# Patient Record
Sex: MALE | Race: WHITE
[De-identification: names, ages, dates, MRNs, and addresses within clinical notes are randomized per-mention and may not be internally consistent; named-entity substitution may affect disease eponyms.]

---

## 2018-10-18 ENCOUNTER — HOSPITAL ENCOUNTER (EMERGENCY)
Dept: HOSPITAL 89 - ER | Age: 18
Discharge: HOME | End: 2018-10-18
Payer: COMMERCIAL

## 2018-10-18 VITALS — SYSTOLIC BLOOD PRESSURE: 136 MMHG | DIASTOLIC BLOOD PRESSURE: 84 MMHG

## 2018-10-18 DIAGNOSIS — B35.6: ICD-10-CM

## 2018-10-18 DIAGNOSIS — R10.11: Primary | ICD-10-CM

## 2018-10-18 LAB — PLATELET COUNT, AUTOMATED: 232 K/UL (ref 150–450)

## 2018-10-18 PROCEDURE — 99284 EMERGENCY DEPT VISIT MOD MDM: CPT

## 2018-10-18 PROCEDURE — 84295 ASSAY OF SERUM SODIUM: CPT

## 2018-10-18 PROCEDURE — 84520 ASSAY OF UREA NITROGEN: CPT

## 2018-10-18 PROCEDURE — 82374 ASSAY BLOOD CARBON DIOXIDE: CPT

## 2018-10-18 PROCEDURE — 82435 ASSAY OF BLOOD CHLORIDE: CPT

## 2018-10-18 PROCEDURE — 82565 ASSAY OF CREATININE: CPT

## 2018-10-18 PROCEDURE — 85025 COMPLETE CBC W/AUTO DIFF WBC: CPT

## 2018-10-18 PROCEDURE — 82040 ASSAY OF SERUM ALBUMIN: CPT

## 2018-10-18 PROCEDURE — 84155 ASSAY OF PROTEIN SERUM: CPT

## 2018-10-18 PROCEDURE — 81001 URINALYSIS AUTO W/SCOPE: CPT

## 2018-10-18 PROCEDURE — 82947 ASSAY GLUCOSE BLOOD QUANT: CPT

## 2018-10-18 PROCEDURE — 82310 ASSAY OF CALCIUM: CPT

## 2018-10-18 PROCEDURE — 84460 ALANINE AMINO (ALT) (SGPT): CPT

## 2018-10-18 PROCEDURE — 76705 ECHO EXAM OF ABDOMEN: CPT

## 2018-10-18 PROCEDURE — 84132 ASSAY OF SERUM POTASSIUM: CPT

## 2018-10-18 PROCEDURE — 82247 BILIRUBIN TOTAL: CPT

## 2018-10-18 PROCEDURE — 84450 TRANSFERASE (AST) (SGOT): CPT

## 2018-10-18 PROCEDURE — 82150 ASSAY OF AMYLASE: CPT

## 2018-10-18 PROCEDURE — 83690 ASSAY OF LIPASE: CPT

## 2018-10-18 PROCEDURE — 84075 ASSAY ALKALINE PHOSPHATASE: CPT

## 2018-10-18 PROCEDURE — 86140 C-REACTIVE PROTEIN: CPT

## 2018-10-18 NOTE — ER REPORT
History and Physical


Time Seen By MD:  13:28


Hx. of Stated Complaint:  


Episodic RUQ pain since late August, increasing in frequency, self limiting. No 


N/V/D.


HPI/ROS


CHIEF COMPLAINT: Right upper quadrant abdominal pain





HISTORY OF PRESENT ILLNESS: 18-year-old male patient presents to emergency room 


with complaint of right upper quadrant abdominal pain. Patient states he's had 


the pain intermittently for 3 months. He states that he son is a 15 cc make the 


pain better or worse. Patient states that oftentimes pain will be dull and may 


last for up to an hour. He states that other times she can be very sharp and 


just lasts for a few seconds. Patient states he's not noted anything that seems 


to make the pain better or worse. He denies having any fevers, chills, nausea, 


vomiting or diarrhea. Patient denies any fevers or chills.





REVIEW OF SYSTEMS:


Respiratory: No cough, no dyspnea.


Cardiovascular: No chest pain, no palpitations.


Gastrointestinal: As noted above


Musculoskeletal: No back pain.


Allergies:  


Coded Allergies:  


     Latex, Natural Rubber (Verified  Allergy, Mild, ITCHY THROAT, 10/18/18)


Home Meds


Active Scripts


Terbinafine Hcl (TERBINAFINE HCL) 250 Mg Tablet, 250 MG PO DAILY, #14 TAB


   Prov:BERNABE MOYER         10/18/18


Dicyclomine Hcl (DICYCLOMINE HCL) 20 Mg Tablet, 20 MG PO QID, #60 TAB


   Prov:BERNABE MOYER         10/18/18


Past Medical/Surgical History


Patient denies any pertinent medical or surgical history.


Reviewed Nurses Notes:  Yes


Hx Substance Use Disorder:  No


Constitutional





Vital Sign - Last 24 Hours








 10/18/18 10/18/18 10/18/18 10/18/18





 12:53 13:10 14:02 14:17


 


Temp 97.7 98.1  


 


Pulse 65 78 67 66


 


Resp 14 16  


 


B/P (MAP) 134/99 136/84  


 


Pulse Ox 95 96 95 95


 


O2 Delivery Room Air Room Air  


 


    





 10/18/18 10/18/18 10/18/18 10/18/18





 14:32 14:37 14:52 15:07


 


Pulse 62 68 57 72


 


Pulse Ox 95 96 93 93





 10/18/18   





 15:22   


 


Pulse 59   


 


Pulse Ox 94   








Physical Exam


  General Appearance: The patient is alert, has no immediate need for airway 


protection and no current signs of toxicity.


Respiratory: Chest is non tender, lungs are clear to auscultation.


Cardiac: regular rate and rhythm


Gastrointestinal: Abdomen is soft and tender in the right upper quadrant, no 


masses, bowel sounds normal.


Musculoskeletal:  Neck: Neck is supple and non tender.


   Extremities have full range of motion and are non tender.


Skin: No rashes or lesions.





DIFFERENTIAL DIAGNOSIS: After history and physical exam differential diagnosis 


was considered for abdominal pain including but not limited to appendicitis, 


cholecystitis, gastritis and urinary tract infection.





Medical Decision Making


Data Points


Result Diagram:  


10/18/18 1329                                                                   


            10/18/18 1329





Laboratory





Hematology








Test


 10/18/18


13:10 10/18/18


13:29


 


Urine Color Yellow  


 


Urine Clarity Clear  


 


Urine pH


 5.0 pH


(4.8-9.5) 





 


Urine Specific Gravity 1.014  


 


Urine Protein


 Negative mg/dL


(NEGATIVE) 





 


Urine Glucose (UA)


 Negative mg/dL


(NEGATIVE) 





 


Urine Ketones


 Negative mg/dL


(NEGATIVE) 





 


Urine Blood


 Negative


(NEGATIVE) 





 


Urine Nitrite


 Negative


(NEGATIVE) 





 


Urine Bilirubin


 Negative


(NEGATIVE) 





 


Urine Urobilinogen


 Negative mg/dL


(0.2-1.9) 





 


Urine Leukocyte Esterase


 Negative


(NEGATIVE) 





 


Urine RBC


 None /HPF


(0-2/HPF) 





 


Urine WBC


 <1 /HPF


(0-5/HPF) 





 


Urine Squamous Epithelial


Cells None /LPF


(</=FEW) 





 


Urine Transitional Epithelial


Cells Few /LPF


(NONE-FEW) 





 


Urine Bacteria


 Negative /HPF


(NONE-FEW) 





 


Urine Mucus


 None /HPF


(NONE-FEW) 





 


Red Blood Count


 


 5.60 M/uL


(4.00-5.60)


 


Mean Corpuscular Volume


 


 93.9 fL


(80.0-96.0)


 


Mean Corpuscular Hemoglobin


 


 32.2 pg


(26.0-33.0)


 


Mean Corpuscular Hemoglobin


Concent 


 34.3 g/dL


(32.0-36.0)


 


Red Cell Distribution Width


 


 12.9 %


(11.5-14.5)


 


Mean Platelet Volume


 


 8.6 fL


(7.2-11.1)


 


Neutrophils (%) (Auto)


 


 71.5 %


(39.4-72.5)


 


Lymphocytes (%) (Auto)


 


 19.2 %


(17.6-49.6)


 


Monocytes (%) (Auto)


 


 7.5 %


(4.1-12.4)


 


Eosinophils (%) (Auto)


 


 1.5 %


(0.4-6.7)


 


Basophils (%) (Auto)


 


 0.3 %


(0.3-1.4)


 


Nucleated RBC Relative Count


(auto) 


 0.0 /100WBC 





 


Neutrophils # (Auto)


 


 6.2 K/uL


(2.0-7.4)


 


Lymphocytes # (Auto)


 


 1.6 K/uL


(1.3-3.6)


 


Monocytes # (Auto)


 


 0.6 K/uL


(0.3-1.0)


 


Eosinophils # (Auto)


 


 0.1 K/uL


(0.0-0.5)


 


Basophils # (Auto)


 


 0.0 K/uL


(0.0-0.1)


 


Nucleated RBC Absolute Count


(auto) 


 0.00 K/uL 





 


Sodium Level


 


 141 mmol/L


(137-145)


 


Potassium Level


 


 3.6 mmol/L


(3.5-5.0)


 


Chloride Level


 


 100 mmol/L


()


 


Carbon Dioxide Level


 


 28 mmol/L


(22-30)


 


Blood Urea Nitrogen


 


 13 mg/dl


(9-21)


 


Creatinine


 


 0.80 mg/dl


(0.66-1.25)


 


Glomerular Filtration Rate


Calc 


 > 60.0 





 


Random Glucose


 


 100 mg/dl


()


 


Calcium Level


 


 9.8 mg/dl


(8.4-10.2)


 


Total Bilirubin


 


 0.7 mg/dl


(0.2-1.3)


 


Aspartate Amino Transf


(AST/SGOT) 


 24 U/L (0-35) 





 


Alanine Aminotransferase


(ALT/SGPT) 


 30 U/L (0-56) 





 


Alkaline Phosphatase  69 U/L (0-126) 


 


C-Reactive Protein


 


 < 0.5 mg/dl


(<1.0)


 


Total Protein


 


 8.1 g/dl


(6.3-8.2)


 


Albumin


 


 4.7 g/dl


(3.5-5.0)


 


Amylase Level  85 U/L (0-110) 


 


Lipase


 


 41 U/L


()








Chemistry








Test


 10/18/18


13:10 10/18/18


13:29


 


Urine Color Yellow  


 


Urine Clarity Clear  


 


Urine pH


 5.0 pH


(4.8-9.5) 





 


Urine Specific Gravity 1.014  


 


Urine Protein


 Negative mg/dL


(NEGATIVE) 





 


Urine Glucose (UA)


 Negative mg/dL


(NEGATIVE) 





 


Urine Ketones


 Negative mg/dL


(NEGATIVE) 





 


Urine Blood


 Negative


(NEGATIVE) 





 


Urine Nitrite


 Negative


(NEGATIVE) 





 


Urine Bilirubin


 Negative


(NEGATIVE) 





 


Urine Urobilinogen


 Negative mg/dL


(0.2-1.9) 





 


Urine Leukocyte Esterase


 Negative


(NEGATIVE) 





 


Urine RBC


 None /HPF


(0-2/HPF) 





 


Urine WBC


 <1 /HPF


(0-5/HPF) 





 


Urine Squamous Epithelial


Cells None /LPF


(</=FEW) 





 


Urine Transitional Epithelial


Cells Few /LPF


(NONE-FEW) 





 


Urine Bacteria


 Negative /HPF


(NONE-FEW) 





 


Urine Mucus


 None /HPF


(NONE-FEW) 





 


White Blood Count


 


 8.6 k/uL


(4.5-11.0)


 


Red Blood Count


 


 5.60 M/uL


(4.00-5.60)


 


Hemoglobin


 


 18.1 g/dL


(14.0-18.0)


 


Hematocrit


 


 52.6 %


(42.0-52.0)


 


Mean Corpuscular Volume


 


 93.9 fL


(80.0-96.0)


 


Mean Corpuscular Hemoglobin


 


 32.2 pg


(26.0-33.0)


 


Mean Corpuscular Hemoglobin


Concent 


 34.3 g/dL


(32.0-36.0)


 


Red Cell Distribution Width


 


 12.9 %


(11.5-14.5)


 


Platelet Count


 


 232 K/uL


(150-450)


 


Mean Platelet Volume


 


 8.6 fL


(7.2-11.1)


 


Neutrophils (%) (Auto)


 


 71.5 %


(39.4-72.5)


 


Lymphocytes (%) (Auto)


 


 19.2 %


(17.6-49.6)


 


Monocytes (%) (Auto)


 


 7.5 %


(4.1-12.4)


 


Eosinophils (%) (Auto)


 


 1.5 %


(0.4-6.7)


 


Basophils (%) (Auto)


 


 0.3 %


(0.3-1.4)


 


Nucleated RBC Relative Count


(auto) 


 0.0 /100WBC 





 


Neutrophils # (Auto)


 


 6.2 K/uL


(2.0-7.4)


 


Lymphocytes # (Auto)


 


 1.6 K/uL


(1.3-3.6)


 


Monocytes # (Auto)


 


 0.6 K/uL


(0.3-1.0)


 


Eosinophils # (Auto)


 


 0.1 K/uL


(0.0-0.5)


 


Basophils # (Auto)


 


 0.0 K/uL


(0.0-0.1)


 


Nucleated RBC Absolute Count


(auto) 


 0.00 K/uL 





 


Glomerular Filtration Rate


Calc 


 > 60.0 





 


Calcium Level


 


 9.8 mg/dl


(8.4-10.2)


 


Total Bilirubin


 


 0.7 mg/dl


(0.2-1.3)


 


Aspartate Amino Transf


(AST/SGOT) 


 24 U/L (0-35) 





 


Alanine Aminotransferase


(ALT/SGPT) 


 30 U/L (0-56) 





 


Alkaline Phosphatase  69 U/L (0-126) 


 


C-Reactive Protein


 


 < 0.5 mg/dl


(<1.0)


 


Total Protein


 


 8.1 g/dl


(6.3-8.2)


 


Albumin


 


 4.7 g/dl


(3.5-5.0)


 


Amylase Level  85 U/L (0-110) 


 


Lipase


 


 41 U/L


()








Urinalysis








Test


 10/18/18


13:10


 


Urine Color Yellow 


 


Urine Clarity Clear 


 


Urine pH


 5.0 pH


(4.8-9.5)


 


Urine Specific Gravity 1.014 


 


Urine Protein


 Negative mg/dL


(NEGATIVE)


 


Urine Glucose (UA)


 Negative mg/dL


(NEGATIVE)


 


Urine Ketones


 Negative mg/dL


(NEGATIVE)


 


Urine Blood


 Negative


(NEGATIVE)


 


Urine Nitrite


 Negative


(NEGATIVE)


 


Urine Bilirubin


 Negative


(NEGATIVE)


 


Urine Urobilinogen


 Negative mg/dL


(0.2-1.9)


 


Urine Leukocyte Esterase


 Negative


(NEGATIVE)


 


Urine RBC


 None /HPF


(0-2/HPF)


 


Urine WBC


 <1 /HPF


(0-5/HPF)


 


Urine Squamous Epithelial


Cells None /LPF


(</=FEW)


 


Urine Transitional Epithelial


Cells Few /LPF


(NONE-FEW)


 


Urine Bacteria


 Negative /HPF


(NONE-FEW)


 


Urine Mucus


 None /HPF


(NONE-FEW)











EKG/Imaging


Imaging


GALLBLADDER


 


HISTORY:  Right upper quadrant pain


 


COMPARISON:  None.


 


FINDINGS:


 


Gallbladder: Unremarkable; no stones or sludge.


 


Liver: Negative.


 


Common duct: Normal, 2.4 mm diameter.


 


Pancreas: Partially obscured by bowel, visualized aspects unremarkable.


 


Right kidney: Right kidney appears unremarkable without evidence of 


hydronephrosis


 


Upper abdominal aorta and IVC: Patent.


 


Ascites: None visualized.


 


IMPRESSION:


Unremarkable right upper quadrant ultrasound


 


Report Dictated By: Ethel Perez MD at 10/18/2018 3:08 PM


 


Report E-Signed By: Ethel Perez MD  at 10/18/2018 3:10 PM





ED Course/Re-evaluation


ED Course


Patient was admitted to exam room, history and physical were obtained. 


Differential diagnoses were considered. On examination patient has tenderness to


the right upper quadrant. A CBC, CMP, amylase, lipase, CRP were done. Lab 


results were unremarkable. An ultrasound was done of the right upper quadrant. 


The results were negative. I discussed the findings with the patient. I am 


unsure as to the underlying cause of his pain. Could be a muscle spasm he could 


be spasm of the colon. We'll go ahead and discharge patient home at this time. 


We will have him take dicyclomine to see if that helps. Patient was given a 


prescription for that encouraged follow-up with primary care provider. He can 


follow-up with Dr. Ullrich for further evaluation of the GI tract. Patient 


verbalized understanding and agreement with plan.


Decision to Disposition Date:  Oct 18, 2018


Decision to Disposition Time:  15:43





Depart


Departure


Latest Vital Signs





Vital Signs








  Date Time  Temp Pulse Resp B/P (MAP) Pulse Ox O2 Delivery O2 Flow Rate FiO2


 


10/18/18 15:22  59   94   


 


10/18/18 13:10 98.1  16 136/84  Room Air  








Impression:  


   Primary Impression:  


   Abdominal pain


   Additional Impression:  


   Tinea cruris


Condition:  Improved


Disposition:  HOME OR SELF-CARE


New Scripts


Terbinafine Hcl (TERBINAFINE HCL) 250 Mg Tablet


250 MG PO DAILY, #14 TAB


   Prov: BERNABE MOYER         10/18/18 


Dicyclomine Hcl (DICYCLOMINE HCL) 20 Mg Tablet


20 MG PO QID, #60 TAB


   Prov: BERNABE MOYER         10/18/18


Patient Instructions:  Abdominal Pain (ED)





Additional Instructions:  


Increase fluid intake.


Get plenty of rest.


Continue with your current activity level and diet.


Return to the ER if condition worsens.


Follow up with a primary care provider in the next week.





Problem Qualifiers








   Primary Impression:  


   Abdominal pain


   Abdominal location:  right upper quadrant  Qualified Codes:  R10.11 - Right 


   upper quadrant pain








BERNABE MOYER                Oct 18, 2018 13:28

## 2018-10-18 NOTE — RADIOLOGY IMAGING REPORT
FACILITY: [facility] 

 

PATIENT NAME: Hernandez Chase

: 2000

MR: [id number]

V: [id number]

EXAM DATE: 

ORDERING PHYSICIAN: BERNABE MOYER

TECHNOLOGIST: 

 

[place] [facility] [place]: Hernandez Chase

: 2000

MRN: [id number]

Visit/Account:[id number]

Date of Sevice: 10/18/2018

 

ACCESSION #: [id number]

 

GALLBLADDER

 

HISTORY:  Right upper quadrant pain

 

COMPARISON:  None.

 

FINDINGS:

 

Gallbladder: Unremarkable; no stones or sludge.

 

Liver: Negative.

 

Common duct: Normal, 2.4 mm diameter.

 

Pancreas: Partially obscured by bowel, visualized aspects unremarkable.

 

Right kidney: Right kidney appears unremarkable without evidence of hydronephrosis

 

Upper abdominal aorta and IVC: Patent.

 

Ascites: None visualized.

 

IMPRESSION:

Unremarkable right upper quadrant ultrasound

 

Report Dictated By: Ethel Perez MD at 10/18/2018 3:08 PM

 

Report E-Signed By: Ethel Perez MD  at 10/18/2018 3:10 PM

 

WSN:[id number]

## 2019-07-20 ENCOUNTER — HOSPITAL ENCOUNTER (OUTPATIENT)
Dept: HOSPITAL 89 - ZZGRANDUC | Age: 19
End: 2019-07-20
Attending: NURSE PRACTITIONER
Payer: COMMERCIAL

## 2019-07-20 DIAGNOSIS — Z02.0: Primary | ICD-10-CM

## 2019-07-20 PROCEDURE — 86706 HEP B SURFACE ANTIBODY: CPT
